# Patient Record
Sex: FEMALE | Race: WHITE | NOT HISPANIC OR LATINO | Employment: UNEMPLOYED | URBAN - METROPOLITAN AREA
[De-identification: names, ages, dates, MRNs, and addresses within clinical notes are randomized per-mention and may not be internally consistent; named-entity substitution may affect disease eponyms.]

---

## 2017-06-16 ENCOUNTER — APPOINTMENT (OUTPATIENT)
Dept: GENERAL RADIOLOGY | Facility: HOSPITAL | Age: 37
End: 2017-06-16

## 2017-06-16 ENCOUNTER — HOSPITAL ENCOUNTER (EMERGENCY)
Facility: HOSPITAL | Age: 37
Discharge: HOME | End: 2017-06-16
Attending: EMERGENCY MEDICINE | Admitting: EMERGENCY MEDICINE

## 2017-06-16 VITALS
HEART RATE: 77 BPM | HEIGHT: 65 IN | WEIGHT: 115 LBS | BODY MASS INDEX: 19.16 KG/M2 | TEMPERATURE: 98.7 F | DIASTOLIC BLOOD PRESSURE: 77 MMHG | SYSTOLIC BLOOD PRESSURE: 109 MMHG | OXYGEN SATURATION: 95 % | RESPIRATION RATE: 18 BRPM

## 2017-06-16 DIAGNOSIS — M25.552 LEFT HIP PAIN: Primary | ICD-10-CM

## 2017-06-16 DIAGNOSIS — Z33.1 PREGNANCY, INCIDENTAL: ICD-10-CM

## 2017-06-16 LAB — HCG INTACT+B SERPL-ACNC: NORMAL MIU/ML

## 2017-06-16 PROCEDURE — 99283 EMERGENCY DEPT VISIT LOW MDM: CPT

## 2017-06-16 PROCEDURE — 73501 X-RAY EXAM HIP UNI 1 VIEW: CPT

## 2017-06-16 PROCEDURE — 84702 CHORIONIC GONADOTROPIN TEST: CPT | Performed by: PHYSICIAN ASSISTANT

## 2017-06-16 NOTE — ED TRIAGE NOTES
Pt reports that she has severe left hip pain starting one week ago. Pt stated that her hip pain after a course of flagyl to treat BV. Pt states that she is pregnant. Pt states that her boyfriend just has a bought with similar left hip pain.

## 2017-06-16 NOTE — ED PROVIDER NOTES
I supervised care provided by the midlevel provider.    We have discussed this patient's history, physical exam, and treatment plan.   I have reviewed the note and personally saw and examined the patient and agree with the plan of care.      Pt reports that she is about 10 weeks pregnant currently. Pt presents to the ED c/o left hip pain. Pain worsens with movement of the LLE. No known injuries sustained. Pt denies abdominal pain, N/V/D, and dysuria. On physical exam, abdomen is soft and nontender. Pt has minimal left hip pain. Pt has ambulated in the ER without significant difficulty. Pt's left hip X-Ray shows no acute abnormality. Pt advised to take tylenol for pain control.                 Documentation assistance provided by Natividad Irwin. Information recorded by the scribe was done at my direction and has been verified and validated by me.     Entered by Natividad Irwin, acting as scribe for Dr. Kd MD.                    Natividad Irwin  06/16/17 1959       Figueroa Yi MD  06/16/17 2004

## 2017-06-16 NOTE — ED NOTES
Pt found walking in from outside requesting to go back to her ED room.      Calixto RN notified.     Merary Gamboa RN  06/16/17 1928
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
## 2017-06-16 NOTE — ED NOTES
Unable to obtain fetal heart tones.  Irish notified and new orders received     Calixto Luis RN  06/16/17 1928

## 2017-06-17 NOTE — DISCHARGE INSTRUCTIONS
PLEASE READ AND REVIEW ALL DISCHARGE INSTRUCTIONS.     Please follow up with your primary care physician for any further evaluation/treatment and further management of your blood pressure.     Recheck in emergency department for any worsening and/or concerning symptoms.     Take all prescribed medicine as written and continue chronic medication.    Take Tylenol as needed for pain.  Follow up with OBGYN for prenatal care.